# Patient Record
Sex: FEMALE | Race: WHITE | Employment: FULL TIME | URBAN - METROPOLITAN AREA
[De-identification: names, ages, dates, MRNs, and addresses within clinical notes are randomized per-mention and may not be internally consistent; named-entity substitution may affect disease eponyms.]

---

## 2021-10-13 ENCOUNTER — HOSPITAL ENCOUNTER (INPATIENT)
Age: 36
LOS: 2 days | Discharge: HOME | End: 2021-10-16
Attending: OBSTETRICS & GYNECOLOGY | Admitting: OBSTETRICS & GYNECOLOGY
Payer: COMMERCIAL

## 2021-10-13 DIAGNOSIS — G89.18 POST-OPERATIVE PAIN: Primary | ICD-10-CM

## 2021-10-14 ENCOUNTER — ANESTHESIA EVENT (OUTPATIENT)
Dept: LABOR AND DELIVERY | Age: 36
End: 2021-10-14
Payer: COMMERCIAL

## 2021-10-14 ENCOUNTER — ANESTHESIA (OUTPATIENT)
Dept: LABOR AND DELIVERY | Age: 36
End: 2021-10-14
Payer: COMMERCIAL

## 2021-10-14 VITALS — SYSTOLIC BLOOD PRESSURE: 110 MMHG | OXYGEN SATURATION: 100 % | DIASTOLIC BLOOD PRESSURE: 56 MMHG

## 2021-10-14 PROBLEM — Z3A.39 39 WEEKS GESTATION OF PREGNANCY: Status: ACTIVE | Noted: 2021-10-14

## 2021-10-14 LAB
ABO/RH: NORMAL
AMNISURE, POC: POSITIVE
AMPHETAMINE SCREEN, URINE: NOT DETECTED
ANTIBODY SCREEN: NORMAL
BARBITURATE SCREEN URINE: NOT DETECTED
BENZODIAZEPINE SCREEN, URINE: NOT DETECTED
CANNABINOID SCREEN URINE: NOT DETECTED
COCAINE METABOLITE SCREEN URINE: NOT DETECTED
FENTANYL SCREEN, URINE: NOT DETECTED
HCT VFR BLD CALC: 30.3 % (ref 34–48)
HEMOGLOBIN: 10.4 G/DL (ref 11.5–15.5)
Lab: NORMAL
Lab: NORMAL
MCH RBC QN AUTO: 31 PG (ref 26–35)
MCHC RBC AUTO-ENTMCNC: 34.3 % (ref 32–34.5)
MCV RBC AUTO: 90.2 FL (ref 80–99.9)
METHADONE SCREEN, URINE: NOT DETECTED
NEGATIVE QC PASS/FAIL: NORMAL
OPIATE SCREEN URINE: NOT DETECTED
OXYCODONE URINE: NOT DETECTED
PDW BLD-RTO: 14.2 FL (ref 11.5–15)
PHENCYCLIDINE SCREEN URINE: NOT DETECTED
PLATELET # BLD: 273 E9/L (ref 130–450)
PMV BLD AUTO: 9.7 FL (ref 7–12)
POSITIVE QC PASS/FAIL: NORMAL
RBC # BLD: 3.36 E12/L (ref 3.5–5.5)
WBC # BLD: 8.9 E9/L (ref 4.5–11.5)

## 2021-10-14 PROCEDURE — 2580000003 HC RX 258: Performed by: NURSE ANESTHETIST, CERTIFIED REGISTERED

## 2021-10-14 PROCEDURE — 7100000000 HC PACU RECOVERY - FIRST 15 MIN: Performed by: OBSTETRICS & GYNECOLOGY

## 2021-10-14 PROCEDURE — 1220000000 HC SEMI PRIVATE OB R&B

## 2021-10-14 PROCEDURE — 7100000001 HC PACU RECOVERY - ADDTL 15 MIN: Performed by: OBSTETRICS & GYNECOLOGY

## 2021-10-14 PROCEDURE — 36415 COLL VENOUS BLD VENIPUNCTURE: CPT

## 2021-10-14 PROCEDURE — 6370000000 HC RX 637 (ALT 250 FOR IP)

## 2021-10-14 PROCEDURE — 80307 DRUG TEST PRSMV CHEM ANLYZR: CPT

## 2021-10-14 PROCEDURE — 6360000002 HC RX W HCPCS: Performed by: OBSTETRICS & GYNECOLOGY

## 2021-10-14 PROCEDURE — 3700000000 HC ANESTHESIA ATTENDED CARE: Performed by: OBSTETRICS & GYNECOLOGY

## 2021-10-14 PROCEDURE — 2580000003 HC RX 258: Performed by: OBSTETRICS & GYNECOLOGY

## 2021-10-14 PROCEDURE — 3609079900 HC CESAREAN SECTION: Performed by: OBSTETRICS & GYNECOLOGY

## 2021-10-14 PROCEDURE — 2500000003 HC RX 250 WO HCPCS: Performed by: NURSE ANESTHETIST, CERTIFIED REGISTERED

## 2021-10-14 PROCEDURE — 6370000000 HC RX 637 (ALT 250 FOR IP): Performed by: OBSTETRICS & GYNECOLOGY

## 2021-10-14 PROCEDURE — 6360000002 HC RX W HCPCS: Performed by: NURSE ANESTHETIST, CERTIFIED REGISTERED

## 2021-10-14 PROCEDURE — 3700000001 HC ADD 15 MINUTES (ANESTHESIA): Performed by: OBSTETRICS & GYNECOLOGY

## 2021-10-14 PROCEDURE — 86900 BLOOD TYPING SEROLOGIC ABO: CPT

## 2021-10-14 PROCEDURE — 2709999900 HC NON-CHARGEABLE SUPPLY: Performed by: OBSTETRICS & GYNECOLOGY

## 2021-10-14 PROCEDURE — 85027 COMPLETE CBC AUTOMATED: CPT

## 2021-10-14 PROCEDURE — 86850 RBC ANTIBODY SCREEN: CPT

## 2021-10-14 PROCEDURE — 86901 BLOOD TYPING SEROLOGIC RH(D): CPT

## 2021-10-14 PROCEDURE — 59514 CESAREAN DELIVERY ONLY: CPT | Performed by: MIDWIFE

## 2021-10-14 PROCEDURE — 6360000002 HC RX W HCPCS

## 2021-10-14 RX ORDER — DOCUSATE SODIUM 100 MG/1
100 CAPSULE, LIQUID FILLED ORAL 2 TIMES DAILY
Status: DISCONTINUED | OUTPATIENT
Start: 2021-10-14 | End: 2021-10-14

## 2021-10-14 RX ORDER — FERROUS SULFATE 325(65) MG
325 TABLET ORAL 2 TIMES DAILY WITH MEALS
Status: DISCONTINUED | OUTPATIENT
Start: 2021-10-14 | End: 2021-10-16 | Stop reason: HOSPADM

## 2021-10-14 RX ORDER — ACETAMINOPHEN 325 MG/1
650 TABLET ORAL EVERY 4 HOURS PRN
Status: DISCONTINUED | OUTPATIENT
Start: 2021-10-14 | End: 2021-10-16 | Stop reason: HOSPADM

## 2021-10-14 RX ORDER — TRISODIUM CITRATE DIHYDRATE AND CITRIC ACID MONOHYDRATE 500; 334 MG/5ML; MG/5ML
30 SOLUTION ORAL ONCE
Status: COMPLETED | OUTPATIENT
Start: 2021-10-14 | End: 2021-10-14

## 2021-10-14 RX ORDER — SODIUM CHLORIDE 9 MG/ML
25 INJECTION, SOLUTION INTRAVENOUS PRN
Status: DISCONTINUED | OUTPATIENT
Start: 2021-10-14 | End: 2021-10-16 | Stop reason: HOSPADM

## 2021-10-14 RX ORDER — SODIUM CHLORIDE 9 MG/ML
INJECTION, SOLUTION INTRAVENOUS CONTINUOUS PRN
Status: DISCONTINUED | OUTPATIENT
Start: 2021-10-14 | End: 2021-10-14 | Stop reason: SDUPTHER

## 2021-10-14 RX ORDER — ONDANSETRON 4 MG/1
8 TABLET, ORALLY DISINTEGRATING ORAL EVERY 8 HOURS PRN
Status: DISCONTINUED | OUTPATIENT
Start: 2021-10-14 | End: 2021-10-16 | Stop reason: HOSPADM

## 2021-10-14 RX ORDER — BUPIVACAINE HYDROCHLORIDE 7.5 MG/ML
INJECTION, SOLUTION INTRASPINAL PRN
Status: DISCONTINUED | OUTPATIENT
Start: 2021-10-14 | End: 2021-10-14 | Stop reason: SDUPTHER

## 2021-10-14 RX ORDER — SODIUM CHLORIDE 0.9 % (FLUSH) 0.9 %
5-40 SYRINGE (ML) INJECTION EVERY 12 HOURS SCHEDULED
Status: DISCONTINUED | OUTPATIENT
Start: 2021-10-14 | End: 2021-10-14

## 2021-10-14 RX ORDER — MORPHINE SULFATE 2 MG/ML
2 INJECTION, SOLUTION INTRAMUSCULAR; INTRAVENOUS
Status: DISCONTINUED | OUTPATIENT
Start: 2021-10-14 | End: 2021-10-16 | Stop reason: HOSPADM

## 2021-10-14 RX ORDER — OXYCODONE HYDROCHLORIDE AND ACETAMINOPHEN 5; 325 MG/1; MG/1
2 TABLET ORAL EVERY 4 HOURS PRN
Status: DISCONTINUED | OUTPATIENT
Start: 2021-10-14 | End: 2021-10-16 | Stop reason: HOSPADM

## 2021-10-14 RX ORDER — SODIUM CHLORIDE, SODIUM LACTATE, POTASSIUM CHLORIDE, CALCIUM CHLORIDE 600; 310; 30; 20 MG/100ML; MG/100ML; MG/100ML; MG/100ML
INJECTION, SOLUTION INTRAVENOUS CONTINUOUS
Status: DISCONTINUED | OUTPATIENT
Start: 2021-10-14 | End: 2021-10-16 | Stop reason: HOSPADM

## 2021-10-14 RX ORDER — PROMETHAZINE HYDROCHLORIDE 25 MG/ML
6.25 INJECTION, SOLUTION INTRAMUSCULAR; INTRAVENOUS
Status: DISCONTINUED | OUTPATIENT
Start: 2021-10-14 | End: 2021-10-14 | Stop reason: HOSPADM

## 2021-10-14 RX ORDER — IBUPROFEN 800 MG/1
800 TABLET ORAL EVERY 8 HOURS PRN
Status: DISCONTINUED | OUTPATIENT
Start: 2021-10-15 | End: 2021-10-16 | Stop reason: HOSPADM

## 2021-10-14 RX ORDER — ONDANSETRON 2 MG/ML
INJECTION INTRAMUSCULAR; INTRAVENOUS PRN
Status: DISCONTINUED | OUTPATIENT
Start: 2021-10-14 | End: 2021-10-14 | Stop reason: SDUPTHER

## 2021-10-14 RX ORDER — SODIUM CHLORIDE 0.9 % (FLUSH) 0.9 %
10 SYRINGE (ML) INJECTION EVERY 12 HOURS SCHEDULED
Status: DISCONTINUED | OUTPATIENT
Start: 2021-10-14 | End: 2021-10-16 | Stop reason: HOSPADM

## 2021-10-14 RX ORDER — SODIUM CHLORIDE 0.9 % (FLUSH) 0.9 %
10 SYRINGE (ML) INJECTION PRN
Status: DISCONTINUED | OUTPATIENT
Start: 2021-10-14 | End: 2021-10-16 | Stop reason: HOSPADM

## 2021-10-14 RX ORDER — SIMETHICONE 80 MG
80 TABLET,CHEWABLE ORAL EVERY 6 HOURS PRN
Status: DISCONTINUED | OUTPATIENT
Start: 2021-10-14 | End: 2021-10-15 | Stop reason: RX

## 2021-10-14 RX ORDER — FENTANYL CITRATE 50 UG/ML
50 INJECTION, SOLUTION INTRAMUSCULAR; INTRAVENOUS EVERY 5 MIN PRN
Status: DISCONTINUED | OUTPATIENT
Start: 2021-10-14 | End: 2021-10-14 | Stop reason: HOSPADM

## 2021-10-14 RX ORDER — OXYCODONE HYDROCHLORIDE AND ACETAMINOPHEN 5; 325 MG/1; MG/1
1 TABLET ORAL EVERY 4 HOURS PRN
Status: DISCONTINUED | OUTPATIENT
Start: 2021-10-14 | End: 2021-10-16 | Stop reason: HOSPADM

## 2021-10-14 RX ORDER — FENTANYL CITRATE 50 UG/ML
25 INJECTION, SOLUTION INTRAMUSCULAR; INTRAVENOUS EVERY 5 MIN PRN
Status: DISCONTINUED | OUTPATIENT
Start: 2021-10-14 | End: 2021-10-14 | Stop reason: HOSPADM

## 2021-10-14 RX ORDER — PRENATAL WITH FERROUS FUM AND FOLIC ACID 3080; 920; 120; 400; 22; 1.84; 3; 20; 10; 1; 12; 200; 27; 25; 2 [IU]/1; [IU]/1; MG/1; [IU]/1; MG/1; MG/1; MG/1; MG/1; MG/1; MG/1; UG/1; MG/1; MG/1; MG/1; MG/1
1 TABLET ORAL DAILY
Status: DISCONTINUED | OUTPATIENT
Start: 2021-10-14 | End: 2021-10-16 | Stop reason: HOSPADM

## 2021-10-14 RX ORDER — SODIUM CHLORIDE 0.9 % (FLUSH) 0.9 %
5-40 SYRINGE (ML) INJECTION PRN
Status: DISCONTINUED | OUTPATIENT
Start: 2021-10-14 | End: 2021-10-14

## 2021-10-14 RX ORDER — ONDANSETRON 2 MG/ML
4 INJECTION INTRAMUSCULAR; INTRAVENOUS EVERY 6 HOURS PRN
Status: DISCONTINUED | OUTPATIENT
Start: 2021-10-14 | End: 2021-10-16 | Stop reason: HOSPADM

## 2021-10-14 RX ORDER — MODIFIED LANOLIN
OINTMENT (GRAM) TOPICAL
Status: DISCONTINUED | OUTPATIENT
Start: 2021-10-14 | End: 2021-10-16 | Stop reason: HOSPADM

## 2021-10-14 RX ORDER — LIDOCAINE HYDROCHLORIDE 10 MG/ML
INJECTION, SOLUTION INFILTRATION; PERINEURAL PRN
Status: DISCONTINUED | OUTPATIENT
Start: 2021-10-14 | End: 2021-10-14 | Stop reason: SDUPTHER

## 2021-10-14 RX ORDER — KETOROLAC TROMETHAMINE 30 MG/ML
30 INJECTION, SOLUTION INTRAMUSCULAR; INTRAVENOUS EVERY 6 HOURS
Status: COMPLETED | OUTPATIENT
Start: 2021-10-14 | End: 2021-10-15

## 2021-10-14 RX ORDER — SODIUM CHLORIDE, SODIUM LACTATE, POTASSIUM CHLORIDE, CALCIUM CHLORIDE 600; 310; 30; 20 MG/100ML; MG/100ML; MG/100ML; MG/100ML
INJECTION, SOLUTION INTRAVENOUS CONTINUOUS
Status: DISCONTINUED | OUTPATIENT
Start: 2021-10-14 | End: 2021-10-14

## 2021-10-14 RX ORDER — LIDOCAINE HYDROCHLORIDE 10 MG/ML
INJECTION, SOLUTION INFILTRATION; PERINEURAL
Status: COMPLETED
Start: 2021-10-14 | End: 2021-10-14

## 2021-10-14 RX ORDER — ACETAMINOPHEN 650 MG
TABLET, EXTENDED RELEASE ORAL
Status: DISCONTINUED
Start: 2021-10-14 | End: 2021-10-14

## 2021-10-14 RX ORDER — MORPHINE SULFATE 4 MG/ML
4 INJECTION, SOLUTION INTRAMUSCULAR; INTRAVENOUS
Status: DISCONTINUED | OUTPATIENT
Start: 2021-10-14 | End: 2021-10-16 | Stop reason: HOSPADM

## 2021-10-14 RX ORDER — PHENYLEPHRINE HCL IN 0.9% NACL 1 MG/10 ML
SYRINGE (ML) INTRAVENOUS PRN
Status: DISCONTINUED | OUTPATIENT
Start: 2021-10-14 | End: 2021-10-14 | Stop reason: SDUPTHER

## 2021-10-14 RX ORDER — FENTANYL CITRATE 50 UG/ML
INJECTION, SOLUTION INTRAMUSCULAR; INTRAVENOUS PRN
Status: DISCONTINUED | OUTPATIENT
Start: 2021-10-14 | End: 2021-10-14 | Stop reason: SDUPTHER

## 2021-10-14 RX ORDER — SODIUM CHLORIDE, SODIUM LACTATE, POTASSIUM CHLORIDE, AND CALCIUM CHLORIDE .6; .31; .03; .02 G/100ML; G/100ML; G/100ML; G/100ML
500 INJECTION, SOLUTION INTRAVENOUS PRN
Status: DISCONTINUED | OUTPATIENT
Start: 2021-10-14 | End: 2021-10-14

## 2021-10-14 RX ORDER — ONDANSETRON 2 MG/ML
4 INJECTION INTRAMUSCULAR; INTRAVENOUS EVERY 6 HOURS PRN
Status: DISCONTINUED | OUTPATIENT
Start: 2021-10-14 | End: 2021-10-14

## 2021-10-14 RX ORDER — BISACODYL 10 MG
10 SUPPOSITORY, RECTAL RECTAL DAILY PRN
Status: DISCONTINUED | OUTPATIENT
Start: 2021-10-14 | End: 2021-10-16 | Stop reason: HOSPADM

## 2021-10-14 RX ORDER — TRISODIUM CITRATE DIHYDRATE AND CITRIC ACID MONOHYDRATE 500; 334 MG/5ML; MG/5ML
SOLUTION ORAL
Status: COMPLETED
Start: 2021-10-14 | End: 2021-10-14

## 2021-10-14 RX ORDER — PANTOPRAZOLE SODIUM 40 MG/1
40 TABLET, DELAYED RELEASE ORAL DAILY
Status: DISCONTINUED | OUTPATIENT
Start: 2021-10-14 | End: 2021-10-16 | Stop reason: HOSPADM

## 2021-10-14 RX ORDER — SODIUM CHLORIDE 9 MG/ML
25 INJECTION, SOLUTION INTRAVENOUS PRN
Status: DISCONTINUED | OUTPATIENT
Start: 2021-10-14 | End: 2021-10-14

## 2021-10-14 RX ORDER — SODIUM CHLORIDE, SODIUM LACTATE, POTASSIUM CHLORIDE, AND CALCIUM CHLORIDE .6; .31; .03; .02 G/100ML; G/100ML; G/100ML; G/100ML
1000 INJECTION, SOLUTION INTRAVENOUS PRN
Status: DISCONTINUED | OUTPATIENT
Start: 2021-10-14 | End: 2021-10-14

## 2021-10-14 RX ORDER — DOCUSATE SODIUM 100 MG/1
100 CAPSULE, LIQUID FILLED ORAL 2 TIMES DAILY
Status: DISCONTINUED | OUTPATIENT
Start: 2021-10-14 | End: 2021-10-16 | Stop reason: HOSPADM

## 2021-10-14 RX ADMIN — SODIUM CITRATE AND CITRIC ACID MONOHYDRATE 30 ML: 500; 334 SOLUTION ORAL at 05:18

## 2021-10-14 RX ADMIN — BUPIVACAINE HYDROCHLORIDE IN DEXTROSE 1.6 MG: 7.5 INJECTION, SOLUTION SUBARACHNOID at 05:36

## 2021-10-14 RX ADMIN — LIDOCAINE HYDROCHLORIDE 3 ML: 10 INJECTION, SOLUTION INFILTRATION; PERINEURAL at 05:34

## 2021-10-14 RX ADMIN — TRISODIUM CITRATE DIHYDRATE AND CITRIC ACID MONOHYDRATE 30 ML: 500; 334 SOLUTION ORAL at 05:18

## 2021-10-14 RX ADMIN — ONDANSETRON 4 MG: 2 INJECTION INTRAMUSCULAR; INTRAVENOUS at 05:26

## 2021-10-14 RX ADMIN — DOCUSATE SODIUM 100 MG: 100 CAPSULE, LIQUID FILLED ORAL at 19:30

## 2021-10-14 RX ADMIN — SODIUM CHLORIDE, PRESERVATIVE FREE 10 ML: 5 INJECTION INTRAVENOUS at 20:57

## 2021-10-14 RX ADMIN — CEFAZOLIN 1000 MG: 1 INJECTION, POWDER, FOR SOLUTION INTRAMUSCULAR; INTRAVENOUS at 13:38

## 2021-10-14 RX ADMIN — Medication 909 ML/HR: at 05:56

## 2021-10-14 RX ADMIN — KETOROLAC TROMETHAMINE 30 MG: 30 INJECTION, SOLUTION INTRAMUSCULAR at 07:52

## 2021-10-14 RX ADMIN — CEFAZOLIN 1000 MG: 1 INJECTION, POWDER, FOR SOLUTION INTRAMUSCULAR; INTRAVENOUS at 20:57

## 2021-10-14 RX ADMIN — OXYCODONE AND ACETAMINOPHEN 2 TABLET: 5; 325 TABLET ORAL at 18:37

## 2021-10-14 RX ADMIN — OXYCODONE AND ACETAMINOPHEN 2 TABLET: 5; 325 TABLET ORAL at 14:37

## 2021-10-14 RX ADMIN — Medication 2000 MG: at 05:19

## 2021-10-14 RX ADMIN — OXYCODONE AND ACETAMINOPHEN 2 TABLET: 5; 325 TABLET ORAL at 22:42

## 2021-10-14 RX ADMIN — Medication 1 MILLI-UNITS/MIN: at 02:40

## 2021-10-14 RX ADMIN — SODIUM CHLORIDE, POTASSIUM CHLORIDE, SODIUM LACTATE AND CALCIUM CHLORIDE: 600; 310; 30; 20 INJECTION, SOLUTION INTRAVENOUS at 02:30

## 2021-10-14 RX ADMIN — FENTANYL CITRATE 25 MCG: 50 INJECTION, SOLUTION INTRAMUSCULAR; INTRAVENOUS at 05:36

## 2021-10-14 RX ADMIN — SODIUM CHLORIDE: 9 INJECTION, SOLUTION INTRAVENOUS at 05:24

## 2021-10-14 RX ADMIN — KETOROLAC TROMETHAMINE 30 MG: 30 INJECTION, SOLUTION INTRAMUSCULAR at 19:28

## 2021-10-14 RX ADMIN — SODIUM CHLORIDE, PRESERVATIVE FREE 10 ML: 5 INJECTION INTRAVENOUS at 19:29

## 2021-10-14 RX ADMIN — KETOROLAC TROMETHAMINE 30 MG: 30 INJECTION, SOLUTION INTRAMUSCULAR at 13:38

## 2021-10-14 RX ADMIN — Medication 100 MCG: at 06:15

## 2021-10-14 RX ADMIN — OXYCODONE AND ACETAMINOPHEN 1 TABLET: 5; 325 TABLET ORAL at 10:24

## 2021-10-14 ASSESSMENT — PAIN DESCRIPTION - LOCATION: LOCATION: ABDOMEN;INCISION

## 2021-10-14 ASSESSMENT — PULMONARY FUNCTION TESTS
PIF_VALUE: 0

## 2021-10-14 ASSESSMENT — PAIN SCALES - GENERAL
PAINLEVEL_OUTOF10: 2
PAINLEVEL_OUTOF10: 6
PAINLEVEL_OUTOF10: 5
PAINLEVEL_OUTOF10: 0
PAINLEVEL_OUTOF10: 7
PAINLEVEL_OUTOF10: 3
PAINLEVEL_OUTOF10: 7
PAINLEVEL_OUTOF10: 7
PAINLEVEL_OUTOF10: 4

## 2021-10-14 ASSESSMENT — PAIN DESCRIPTION - PAIN TYPE: TYPE: ACUTE PAIN;SURGICAL PAIN

## 2021-10-14 ASSESSMENT — PAIN DESCRIPTION - PROGRESSION: CLINICAL_PROGRESSION: NOT CHANGED

## 2021-10-14 ASSESSMENT — LIFESTYLE VARIABLES: SMOKING_STATUS: 0

## 2021-10-14 ASSESSMENT — PAIN DESCRIPTION - ORIENTATION: ORIENTATION: LOWER

## 2021-10-14 ASSESSMENT — PAIN DESCRIPTION - FREQUENCY: FREQUENCY: INTERMITTENT

## 2021-10-14 ASSESSMENT — PAIN - FUNCTIONAL ASSESSMENT: PAIN_FUNCTIONAL_ASSESSMENT: ACTIVITIES ARE NOT PREVENTED

## 2021-10-14 ASSESSMENT — PAIN DESCRIPTION - DESCRIPTORS: DESCRIPTORS: TENDER

## 2021-10-14 NOTE — ANESTHESIA PRE PROCEDURE
Readings from Last 3 Encounters:   10/14/21 189 lb (85.7 kg)   10/12/21 189 lb 9.6 oz (86 kg)   10/06/21 187 lb (84.8 kg)     Body mass index is 27.91 kg/m². CBC:   Lab Results   Component Value Date    WBC 8.9 10/14/2021    RBC 3.36 10/14/2021    HGB 10.4 10/14/2021    HCT 30.3 10/14/2021    MCV 90.2 10/14/2021    RDW 14.2 10/14/2021     10/14/2021       CMP: No results found for: NA, K, CL, CO2, BUN, CREATININE, GFRAA, AGRATIO, LABGLOM, GLUCOSE, PROT, CALCIUM, BILITOT, ALKPHOS, AST, ALT    POC Tests: No results for input(s): POCGLU, POCNA, POCK, POCCL, POCBUN, POCHEMO, POCHCT in the last 72 hours. Coags: No results found for: PROTIME, INR, APTT    HCG (If Applicable): No results found for: PREGTESTUR, PREGSERUM, HCG, HCGQUANT     ABGs: No results found for: PHART, PO2ART, IBB4AMF, TSG1YMA, BEART, A6DRVFKL     Type & Screen (If Applicable):  No results found for: LABABO, LABRH    Drug/Infectious Status (If Applicable):  No results found for: HIV, HEPCAB    COVID-19 Screening (If Applicable): No results found for: COVID19        Anesthesia Evaluation  Patient summary reviewed and Nursing notes reviewed no history of anesthetic complications:   Airway: Mallampati: II  TM distance: <3 FB     Mouth opening: > = 3 FB Dental:          Pulmonary:Negative Pulmonary ROS breath sounds clear to auscultation      (-) not a current smoker          Patient did not smoke on day of surgery. Cardiovascular:Negative CV ROS  Exercise tolerance: good (>4 METS),           Rhythm: regular  Rate: normal           Beta Blocker:  Not on Beta Blocker         Neuro/Psych:   Negative Neuro/Psych ROS              GI/Hepatic/Renal:   (+) GERD:,           Endo/Other:    (+) blood dyscrasia: anemia:., .          Pt had no PAT visit        ROS comment:  IUP@ 39 week and 4 days Abdominal:             Vascular: negative vascular ROS.          Other Findings:             Anesthesia Plan      epidural, general and spinal     ASA 2     (Discussed labor analgesia options with patient and significant other at bedside. Pt is agreeable to epidural when ready, states she had one previous epidural without complications. Discussed risks/benefits of epidural. )      MIPS: Postoperative opioids intended and Prophylactic antiemetics administered. Anesthetic plan and risks discussed with patient and spouse. Use of blood products discussed with patient whom consented to blood products. Plan discussed with CRNA.                 Abel Kurtz RN, [place]   10/14/2021

## 2021-10-14 NOTE — PROGRESS NOTES
SVE at 0445 4-5 cm, unsure of presenting part. House doctor notified. 0500: Dr Walter Estrada at the bedside with Ultrasound. Confirms baby is breech. c-section called at this time.

## 2021-10-14 NOTE — PROGRESS NOTES
Mom admitted from L&D to 302. Transferred from cart to bed without difficulty. Assessment as charted. IV LR and IV Pitocin infusing as ordered. PCD's on. Arriaza catheter patent - draining clear yellow urine. Comfortable. Oriented to room and unit. FOB @ bedside.

## 2021-10-14 NOTE — ANESTHESIA PROCEDURE NOTES
Spinal Block    Patient location during procedure: OR  Reason for block: primary anesthetic and at surgeon's request  Staffing  Performed: other anesthesia staff   Anesthesiologist: Jessica Kim MD  Resident/CRNA: Mary Manzo APRN - CRNA  Other anesthesia staff: Citlaly Robertson RN  Preanesthetic Checklist  Completed: patient identified, IV checked, site marked, risks and benefits discussed, surgical consent, monitors and equipment checked, pre-op evaluation, timeout performed, anesthesia consent given, oxygen available and patient being monitored  Spinal Block  Patient position: sitting  Prep: ChloraPrep  Patient monitoring: cardiac monitor, continuous pulse ox, continuous capnometry and frequent blood pressure checks  Approach: midline  Location: L3/L4  Provider prep: mask, sterile gloves and sterile gown  Local infiltration: lidocaine  Dose: 0.5  Agent: bupivacaine  Adjuvant: fentanyl  Dose: 1.6  Dose: 1.6  Needle  Needle type: Pencan   Needle gauge: 25 G  Needle length: 3.5 in  Assessment  Sensory level: T6  Events: cerebrospinal fluid  Swirl obtained: Yes  CSF: clear  Attempts: 1  Hemodynamics: stable  Additional Notes  Pt tolerate well, laid immediately supine.  <1cc EBL

## 2021-10-14 NOTE — H&P
Department of Obstetrics and Gynecology  Attending Obstetrics History and Physical        CHIEF COMPLAINT: Spontaneous rupture membranes at 10 PM     HISTORY OF PRESENT ILLNESS:      The patient is a 31 y.o. female , Patient's last menstrual period was 01/10/2021.,  at 39w4d. OB History        2    Para   1    Term   1            AB        Living   1       SAB        TAB        Ectopic        Molar        Multiple        Live Births   1            Patient presents with a chief complaint as above. Estimated Due Date: Estimated Date of Delivery: 10/17/21    PRENATAL CARE:    Complicated by:   Patient Active Problem List   Diagnosis Code    39 weeks gestation of pregnancy Z3A.39       PAST OB HISTORY  OB History        2    Para   1    Term   1            AB        Living   1       SAB        TAB        Ectopic        Molar        Multiple        Live Births   1                Past Medical History:        Diagnosis Date    Anemia     with pregnancy     Past Surgical History:        Procedure Laterality Date    EYE SURGERY      childhood      Social History:    TOBACCO:   reports that she has never smoked. She has never used smokeless tobacco.  ETOH:   reports previous alcohol use. DRUGS:   reports no history of drug use. Family History:   No family history on file. Medications Prior to Admission:  Medications Prior to Admission: clindamycin (CLEOCIN T) 1 % lotion, Apply topically  Omega-3 Fatty Acids (FISH OIL) 500 MG CAPS, Take by mouth  Polysaccharide Iron Complex 391.3 (180 Fe) MG CAPS, Take 180 mg by mouth 2 times daily (with meals)  Potassium 75 MG TABS, Take by mouth  PRENATAL VIT-FE FUMARATE-FA PO, Take 1 tablet by mouth daily  MAGNESIUM PO, Take 1 tablet by mouth daily as needed  Allergies:  Patient has no known allergies.     Review of Systems:   Ears, nose, mouth, throat, and face: negative  Respiratory: negative  Cardiovascular: negative  Gastrointestinal: negative  Genitourinary:negative  Integument/breast: negative  Hematologic/lymphatic: negative  Musculoskeletal:negative  Neurological: negative  Behavioral/Psych: negative  Endocrine: negative  Allergic/Immunologic: negative    PHYSICAL EXAM:    General appearance:  awake, alert, cooperative, no apparent distress, and appears stated age  Neurologic:  Awake, alert, oriented to name, place and time. Cranial nerves II-XII are grossly intact. Motor is 5 out of 5 bilaterally. Cerebellar finger to nose, heel to shin intact. Sensory is intact.   Babinski down going, Romberg negative, and gait is normal.  Lungs:  No increased work of breathing, good air exchange, clear to auscultation bilaterally, no crackles or wheezing  Heart:  Normal apical impulse, regular rate and rhythm, normal S1 and S2, no S3 or S4, and no murmur noted  Abdomen:  No scars, normal bowel sounds, soft, non-distended, non-tender, no masses palpated, no hepatosplenomegally  Fetal heart rate:  Baseline Heart Rate 140, accelerations: Present, decelerations: Absent  Pelvis:  External Genitalia: General appearance; normal, Hair distribution; normal, Lesions absent  Cervix: Per nursing    DILATION:  1cm  EFFACEMENT:   60%  STATION:  -2cm      Contraction frequency:  irregminutes  Membranes: amnisure +    /62   Pulse 57   Temp 98 °F (36.7 °C) (Oral)   Resp 16   Ht 5' 9\" (1.753 m)   Wt 189 lb (85.7 kg)   LMP 01/10/2021   SpO2 99%   BMI 27.91 kg/m²     General Labs:    CBC:   Lab Results   Component Value Date    WBC 8.9 10/14/2021    RBC 3.36 10/14/2021    HGB 10.4 10/14/2021    HCT 30.3 10/14/2021    MCV 90.2 10/14/2021    RDW 14.2 10/14/2021     10/14/2021     CMP:  No results found for: NA, K, CL, CO2, BUN, PROT, ALB  U/A:  No components found for: Charlyne Gold, USPGRAV, UPH, UPROTEIN, UGLUCOSE, UKETONE, UBILI, UBLOOD, UNITRITE, UUROBIL, ULEUKEST, USQEPI, URENEPI, UWBC, URBC, Synchari, UHYALINE    ASSESSMENT AND PLAN:  IUP at 44 weeks  Spontaneous rupture membrane  Dr. Lucrecia Roque notified      Electronically signed by Carmen Oneil MD on 10/14/2021 at 6:34 AM

## 2021-10-14 NOTE — PROGRESS NOTES
I assisted Dr. Luis Manuel Ritter in  section.   I was present from incision through closure of subcutaneous tissue

## 2021-10-14 NOTE — PROCEDURES
PREOPERATIVE DIAGNOSES:     1.  39 week intrauterine pregnancy.   2.  Breech presentation      POSTOPERATIVE DIAGNOSES:     Same.      PROCEDURE: Primary low transverse  section.      SURGEON:  MD gurdeep Chung  ESTIMATED BLOOD LOSS:  725 mL.      COMPLICATIONS:  None.         ANESTHESIA:  spinal.         FINDINGS:  [unfilled]  infant Apgars APGAR One: N/A  and APGAR Five: N/A , weight: [unfilled] lbs  [unfilled] oz  .  Normal  tubes and ovaries bilaterally.         DETAILS OF PROCEDURE:  See Full Note Dictated    Landon Garcia MD 10/14/2021 6:42 AM

## 2021-10-14 NOTE — PROGRESS NOTES
Primary ltcs of viable baby girl at 12. Delayed cord clamping completed. apgars 9/9. Mom and baby stable at this time.

## 2021-10-14 NOTE — PROGRESS NOTES
Taking in po well. Tolerated general diet for lunch. IV fluids discontinued. PRN adapter maintained. PCD's off. Ambulated to bathroom with assist.  Arriaza catheter discontinued without difficulty. DTV [id number]. Vaginal bleeding RNA. Instructed on lisa care. Comfortable. FOB @ bedside.

## 2021-10-14 NOTE — PROGRESS NOTES
Notified Dr Marychuy Shaw of patient arrival. Updated that patient amnisure = positive. Notified of recent sve 1/60/-2. Notified that fhts had one variable with minimal variability, but variability is now moderate. Orders received.

## 2021-10-14 NOTE — PROGRESS NOTES
Ambulated to bathroom with assist.  Voided large amount without difficulty. Re-instructed on lisa care.

## 2021-10-14 NOTE — LACTATION NOTE
Pt is a multip that is choosing to exclusively pump. She is pumping every 3 hours and syringe feeding colostrum. She has a history of oversupply. EBP is set up at bedside. Pt request to be sized for proper flange fit. 24mm standard flange size appropriate for this pt. Encouraged skin to skin and regular pumping sessions as she is actively performing. Support and encouragement given, contact number provided for questions, concerns, or supplies.

## 2021-10-15 LAB
HCT VFR BLD CALC: 29.6 % (ref 34–48)
HEMOGLOBIN: 10 G/DL (ref 11.5–15.5)
MCH RBC QN AUTO: 31.7 PG (ref 26–35)
MCHC RBC AUTO-ENTMCNC: 33.8 % (ref 32–34.5)
MCV RBC AUTO: 94 FL (ref 80–99.9)
PDW BLD-RTO: 14.3 FL (ref 11.5–15)
PLATELET # BLD: 213 E9/L (ref 130–450)
PMV BLD AUTO: 9.8 FL (ref 7–12)
RBC # BLD: 3.15 E12/L (ref 3.5–5.5)
WBC # BLD: 10.5 E9/L (ref 4.5–11.5)

## 2021-10-15 PROCEDURE — 36415 COLL VENOUS BLD VENIPUNCTURE: CPT

## 2021-10-15 PROCEDURE — 6360000002 HC RX W HCPCS: Performed by: OBSTETRICS & GYNECOLOGY

## 2021-10-15 PROCEDURE — 85027 COMPLETE CBC AUTOMATED: CPT

## 2021-10-15 PROCEDURE — 6370000000 HC RX 637 (ALT 250 FOR IP): Performed by: OBSTETRICS & GYNECOLOGY

## 2021-10-15 PROCEDURE — 1220000000 HC SEMI PRIVATE OB R&B

## 2021-10-15 PROCEDURE — 2580000003 HC RX 258: Performed by: OBSTETRICS & GYNECOLOGY

## 2021-10-15 RX ORDER — SIMETHICONE 125 MG
125 TABLET,CHEWABLE ORAL EVERY 6 HOURS PRN
Status: DISCONTINUED | OUTPATIENT
Start: 2021-10-15 | End: 2021-10-16 | Stop reason: HOSPADM

## 2021-10-15 RX ADMIN — Medication: at 08:36

## 2021-10-15 RX ADMIN — SIMETHICONE 125 MG: 125 TABLET, CHEWABLE ORAL at 15:00

## 2021-10-15 RX ADMIN — OXYCODONE AND ACETAMINOPHEN 2 TABLET: 5; 325 TABLET ORAL at 14:59

## 2021-10-15 RX ADMIN — SODIUM CHLORIDE, PRESERVATIVE FREE 10 ML: 5 INJECTION INTRAVENOUS at 01:42

## 2021-10-15 RX ADMIN — IBUPROFEN 800 MG: 800 TABLET, FILM COATED ORAL at 17:34

## 2021-10-15 RX ADMIN — DOCUSATE SODIUM 100 MG: 100 CAPSULE, LIQUID FILLED ORAL at 08:36

## 2021-10-15 RX ADMIN — PANTOPRAZOLE SODIUM 40 MG: 40 TABLET, DELAYED RELEASE ORAL at 08:36

## 2021-10-15 RX ADMIN — IBUPROFEN 800 MG: 800 TABLET, FILM COATED ORAL at 08:36

## 2021-10-15 RX ADMIN — SIMETHICONE 125 MG: 125 TABLET, CHEWABLE ORAL at 00:40

## 2021-10-15 RX ADMIN — OXYCODONE AND ACETAMINOPHEN 2 TABLET: 5; 325 TABLET ORAL at 23:04

## 2021-10-15 RX ADMIN — SIMETHICONE 125 MG: 125 TABLET, CHEWABLE ORAL at 08:36

## 2021-10-15 RX ADMIN — KETOROLAC TROMETHAMINE 30 MG: 30 INJECTION, SOLUTION INTRAMUSCULAR at 01:42

## 2021-10-15 RX ADMIN — METFORMIN HYDROCHLORIDE 1 TABLET: 500 TABLET, EXTENDED RELEASE ORAL at 08:36

## 2021-10-15 RX ADMIN — DOCUSATE SODIUM 100 MG: 100 CAPSULE, LIQUID FILLED ORAL at 20:20

## 2021-10-15 RX ADMIN — OXYCODONE AND ACETAMINOPHEN 2 TABLET: 5; 325 TABLET ORAL at 10:03

## 2021-10-15 RX ADMIN — OXYCODONE AND ACETAMINOPHEN 2 TABLET: 5; 325 TABLET ORAL at 04:47

## 2021-10-15 RX ADMIN — OXYCODONE AND ACETAMINOPHEN 2 TABLET: 5; 325 TABLET ORAL at 18:56

## 2021-10-15 ASSESSMENT — PAIN SCALES - GENERAL
PAINLEVEL_OUTOF10: 7
PAINLEVEL_OUTOF10: 4
PAINLEVEL_OUTOF10: 3
PAINLEVEL_OUTOF10: 2
PAINLEVEL_OUTOF10: 8
PAINLEVEL_OUTOF10: 7

## 2021-10-15 NOTE — ANESTHESIA POSTPROCEDURE EVALUATION
Department of Anesthesiology  Postprocedure Note    Patient: Kathleen Wan  MRN: [id number]  YOB: 1985  Date of evaluation: 10/15/2021  Time:  11:32 AM     Procedure Summary     Date: 10/14/21 Room / Location: [address]    Anesthesia Start: 7658 Anesthesia Stop: 0226    Procedures:        SECTION (N/A Uterus)      Labor Analgesia Diagnosis:        delivery indicated due to breech presentation      (breech)    Surgeons: Kathy Chowdhury MD Responsible Provider: Steve Mares MD    Anesthesia Type: epidural, general, spinal ASA Status: 2          Anesthesia Type: epidural, general, spinal    Radha Phase I: Radha Score: 10    Radha Phase II:      Last vitals: Reviewed and per EMR flowsheets.        Anesthesia Post Evaluation    Patient location during evaluation: bedside  Patient participation: complete - patient participated  Level of consciousness: awake  Pain score: 0  Airway patency: patent  Nausea & Vomiting: no nausea and no vomiting  Complications: no  Cardiovascular status: hemodynamically stable  Respiratory status: acceptable  Hydration status: euvolemic

## 2021-10-15 NOTE — PROGRESS NOTES
Progress Note    SUBJECTIVE:   Pt comfortable; +void; +flatus; +ambulation    OBJECTIVE:    VITALS:  /64   Pulse 69   Temp 98.2 °F (36.8 °C) (Oral)   Resp 18   Ht 5' 9\" (1.753 m)   Wt 189 lb (85.7 kg)   LMP 01/10/2021   SpO2 98%   Breastfeeding Unknown   BMI 27.91 kg/m²   Physical Exam  ABDOMEN:  normal bowel sounds, non-distended, non-tender and incision d/i  Ext nt    DATA:  Hemoglobin/Hematocrit:    Lab Results   Component Value Date    HGB 10.0 10/15/2021    HCT 29.6 10/15/2021       ASSESSMENT AND PLAN:  S/p ltcs  Active Problems:    39 weeks gestation of pregnancy     delivery indicated due to breech presentation  Resolved Problems:    * No resolved hospital problems.  *    POD#1  Plan discharge home in 1-2 days    Electronically signed by Nayeli Wright DO on 10/15/2021 at 3:11 PM

## 2021-10-15 NOTE — PLAN OF CARE
Problem: Anxiety:  Goal: Level of anxiety will decrease  Description: Level of anxiety will decrease  Outcome: Met This Shift     Problem: Fluid Volume - Imbalance:  Goal: Absence of postpartum hemorrhage signs and symptoms  Description: Absence of postpartum hemorrhage signs and symptoms  Outcome: Met This Shift     Problem: Infection - Intrapartum Infection:  Goal: Will show no infection signs and symptoms  Description: Will show no infection signs and symptoms  Outcome: Met This Shift     Problem: Pain - Acute:  Goal: Pain level will decrease  Description: Pain level will decrease  Outcome: Met This Shift  Goal: Able to cope with pain  Description: Able to cope with pain  Outcome: Met This Shift     Problem: Urinary Retention:  Goal: Experiences of bladder distention will decrease  Description: Experiences of bladder distention will decrease  Outcome: Met This Shift  Goal: Urinary elimination within specified parameters  Description: Urinary elimination within specified parameters  Outcome: Met This Shift     Problem: Infection - Surgical Site:  Goal: Will show no infection signs and symptoms  Description: Will show no infection signs and symptoms  Outcome: Met This Shift     Problem: Mood - Altered:  Goal: Mood stable  Description: Mood stable  Outcome: Met This Shift     Problem: Nausea/Vomiting:  Goal: Absence of nausea/vomiting  Description: Absence of nausea/vomiting  Outcome: Met This Shift

## 2021-10-15 NOTE — OP NOTE
The  bladder blade was inserted. Uterus was incised with a scalpel. Membranes were ruptured and noted to be clear and freely flowing. Baby  was delivered blossom breech. Nose and mouth were suctioned with bulb  suction. Cord was clamped and cut, vigorous at birth. A 30-second  delay noted. Baby was handed off to awaiting nursing staff. Cord gases  and cord blood were obtained. Placenta was manually extracted. Uterus  was exteriorized and cleared of all clots and debris. Uterine incision  was closed with chromic suture in a running locked fashion x2. Uterus  was returned to the pelvis. Gutters were cleared of all clots and  debris and excellent hemostasis was confirmed at the uterine incision. SNoW was placed. All instrumentation was removed. Hemostasis was  confirmed once again. Fascia was then closed with Stratafix. Subcutaneous tissue was irrigated and reapproximated with plain and  subcuticular suture followed by staples and Dermabond. Sponge, lap, and  needle counts were correct x2. Two grams of Ancef were given  preoperatively. The patient was transferred to Recovery in stable  condition.         Dante Gann MD    D: 10/14/2021 6:42:29       T: 10/14/2021 10:29:30     [email or web address]  Job#: [id number]     Doc#: [id number]    CC:

## 2021-10-15 NOTE — PROGRESS NOTES
Good post-op day. Ambulating. Voiding. Passing gas. Vaginal bleeding RNA. Satisfied with pain control. FOB @ bedside.

## 2021-10-15 NOTE — LACTATION NOTE
Patient is exclusively pumping Q 3 hrs x 15-20 min. Collected 9 mls and 6 mls at 2 separate pumping sessions this morning. Patient states Medela electric breast pump was making a \"popping\" sound. Replaced and set up new pump at bedside. Gave more bottle nipples per request.  Patient is dedicated and knowledgeable about pumping. Has Hx of over-supply. Gave information on [address] Encouraged to maintain pumping routine to stimulate milk production and to call with any needs. Has Medela electric breast pump at home.

## 2021-10-16 VITALS
SYSTOLIC BLOOD PRESSURE: 107 MMHG | OXYGEN SATURATION: 98 % | HEART RATE: 79 BPM | WEIGHT: 189 LBS | TEMPERATURE: 98 F | RESPIRATION RATE: 16 BRPM | HEIGHT: 69 IN | BODY MASS INDEX: 27.99 KG/M2 | DIASTOLIC BLOOD PRESSURE: 61 MMHG

## 2021-10-16 PROCEDURE — 6370000000 HC RX 637 (ALT 250 FOR IP): Performed by: OBSTETRICS & GYNECOLOGY

## 2021-10-16 RX ORDER — IBUPROFEN 800 MG/1
800 TABLET ORAL EVERY 8 HOURS PRN
Qty: 30 TABLET | Refills: 0 | Status: SHIPPED | OUTPATIENT
Start: 2021-10-16

## 2021-10-16 RX ORDER — OXYCODONE HYDROCHLORIDE AND ACETAMINOPHEN 5; 325 MG/1; MG/1
1 TABLET ORAL EVERY 4 HOURS PRN
Qty: 12 TABLET | Refills: 0 | Status: SHIPPED | OUTPATIENT
Start: 2021-10-16 | End: 2021-10-19

## 2021-10-16 RX ADMIN — PANTOPRAZOLE SODIUM 40 MG: 40 TABLET, DELAYED RELEASE ORAL at 08:34

## 2021-10-16 RX ADMIN — OXYCODONE AND ACETAMINOPHEN 2 TABLET: 5; 325 TABLET ORAL at 08:34

## 2021-10-16 RX ADMIN — IBUPROFEN 800 MG: 800 TABLET, FILM COATED ORAL at 02:20

## 2021-10-16 RX ADMIN — OXYCODONE AND ACETAMINOPHEN 2 TABLET: 5; 325 TABLET ORAL at 04:04

## 2021-10-16 RX ADMIN — DOCUSATE SODIUM 100 MG: 100 CAPSULE, LIQUID FILLED ORAL at 08:34

## 2021-10-16 RX ADMIN — OXYCODONE AND ACETAMINOPHEN 2 TABLET: 5; 325 TABLET ORAL at 12:51

## 2021-10-16 RX ADMIN — OXYCODONE AND ACETAMINOPHEN 2 TABLET: 5; 325 TABLET ORAL at 16:57

## 2021-10-16 RX ADMIN — METFORMIN HYDROCHLORIDE 1 TABLET: 500 TABLET, EXTENDED RELEASE ORAL at 08:34

## 2021-10-16 RX ADMIN — IBUPROFEN 800 MG: 800 TABLET, FILM COATED ORAL at 09:48

## 2021-10-16 ASSESSMENT — PAIN SCALES - GENERAL
PAINLEVEL_OUTOF10: 7
PAINLEVEL_OUTOF10: 7
PAINLEVEL_OUTOF10: 8
PAINLEVEL_OUTOF10: 8
PAINLEVEL_OUTOF10: 3
PAINLEVEL_OUTOF10: 6

## 2021-10-16 NOTE — PROGRESS NOTES
Discharge instructions reviewed and education provided with patient regarding her care and the care of baby. ID bands checked and verified. Prescription for percocet given.

## 2021-10-16 NOTE — DISCHARGE SUMMARY
Obstetric Discharge Summary    Admitting Diagnosis  IUP 39+ weeks  OB History      Para Term  AB Living    3 2 2     2    SAB TAB Ectopic Molar Multiple Live Births              2          Reasons for Admission on 2018 12:21 AM  Hyperemesis [R11.10]  Hyperemesis [R11.10]  Hyperemesis [R11.10]  No comment available  Onset of Labor    Prenatal Procedures  None    Intrapartum Procedures                  Delivery:        Postpartum Procedures  None    Postpartum/Operative Complications       [place] [facility]  This patient has no babies on file. Discharge With Mother  Complications: No    Discharge Diagnosis       Discharge Information  Discharge Medication List as of 2018  9:55 AM      CONTINUE these medications which have CHANGED    Details   ondansetron (ZOFRAN ODT) 4 MG disintegrating tablet Take 1 tablet by mouth every 8 hours as needed for Nausea or Vomiting, Disp-30 tablet, R-0Normal         CONTINUE these medications which have NOT CHANGED    Details   multivitamin (ANIMAL SHAPES) with C & FA CHEW chewable tablet Take 1 tablet by mouth dailyHistorical Med         STOP taking these medications       Rraztn-FwXzz-SaIcm-Meth-FA-DHA (PRENATE MINI) 18-0.6-0.4-350 MG CAPS Comments:   Reason for Stopping:             Condition at Discharge:    Stable    No discharge procedures on file. Discharge to: Home  Follow up in 2 weeks at office.

## 2021-10-16 NOTE — PROGRESS NOTES
Progress Note    SUBJECTIVE:   Pt comfortable; +void; +flatus; +ambulation    OBJECTIVE:    VITALS:  /61   Pulse 79   Temp 98 °F (36.7 °C) (Oral)   Resp 16   Ht 5' 9\" (1.753 m)   Wt 189 lb (85.7 kg)   LMP 01/10/2021   SpO2 98%   Breastfeeding Unknown   BMI 27.91 kg/m²   Physical Exam  ABDOMEN:  normal bowel sounds, non-distended, non-tender and incision d/i  Ext nt    DATA:  Hemoglobin/Hematocrit:    Lab Results   Component Value Date    HGB 10.0 10/15/2021    HCT 29.6 10/15/2021       ASSESSMENT AND PLAN:  S/p ltcs  Active Problems:    39 weeks gestation of pregnancy     delivery indicated due to breech presentation  Resolved Problems:    * No resolved hospital problems.  *    POD#2  Plan discharge home today    Electronically signed by Acacia Tamayo DO on 10/16/2021 at 9:06 AM

## 2021-10-16 NOTE — LACTATION NOTE
Pt continues to exclusively pump. Since switching out EBP production collection had increased. It would be unclear if the pump had an influence or more so timing. Pt is collection about 2 ounces total per pumping as of now. Baby taking approx. 1 ounce per feeding. Pt has all needed supplies at bedside.

## 2021-10-28 PROBLEM — O09.899 SHORT INTERVAL BETWEEN PREGNANCIES AFFECTING PREGNANCY, ANTEPARTUM: Status: ACTIVE | Noted: 2021-04-07

## 2021-10-28 PROBLEM — O42.90 PROM (PREMATURE RUPTURE OF MEMBRANES): Status: ACTIVE | Noted: 2020-07-23

## 2021-10-28 PROBLEM — O09.292 HISTORY OF PREMATURE RUPTURE OF MEMBRANES IN PREVIOUS PREGNANCY, CURRENTLY PREGNANT IN SECOND TRIMESTER: Status: ACTIVE | Noted: 2020-07-23

## (undated) DEVICE — STAPLER SKIN SQ 30 ABSRB STPL DISP INSORB

## (undated) DEVICE — SUTURE PLN GUT SZ 3-0 L27IN ABSRB YELLOWISH TAN L36MM CT-1 842H

## (undated) DEVICE — CONTAINER SPEC 64OZ POLYPR PATH SNAP LOK CAP W/ LID

## (undated) DEVICE — SUTURE STRATAFIX SPRL SZ 1 L14IN ABSRB VLT L48CM CTX 1/2 SXPD2B405

## (undated) DEVICE — COVER,LIGHT HANDLE,FLX,2/PK: Brand: MEDLINE INDUSTRIES, INC.

## (undated) DEVICE — CATHETERIZATION KIT FOL16 FR 2000 CC DRAINAGE BG LUBRICATH

## (undated) DEVICE — COUNTER NDL 30 COUNT DBL MAG

## (undated) DEVICE — GOWN,SIRUS,FABRNF,L,20/CS: Brand: MEDLINE

## (undated) DEVICE — CESAREAN BIRTH PACK II: Brand: MEDLINE INDUSTRIES, INC.

## (undated) DEVICE — SHEET,DRAPE,53X77,STERILE: Brand: MEDLINE

## (undated) DEVICE — SUTURE CHROMIC GUT SZ 1 L36IN ABSRB BRN L40MM CT 1/2 CIR 915H

## (undated) DEVICE — SPONGE LAP W18XL18IN WHT COT 4 PLY FLD STRUNG RADPQ DISP ST

## (undated) DEVICE — 3000CC GUARDIAN II: Brand: GUARDIAN

## (undated) DEVICE — APPLICATOR PREP 26ML 0.7% IOD POVACRYLEX 74% ISO ALC ST

## (undated) DEVICE — MEDI-VAC YANKAUER SUCTION HANDLE W/BULBOUS TIP: Brand: CARDINAL HEALTH

## (undated) DEVICE — ELECTRODE PT RET AD L9FT HI MOIST COND ADH HYDRGEL CORDED

## (undated) DEVICE — TUBING, SUCTION, 3/16" X 12', STRAIGHT: Brand: MEDLINE

## (undated) DEVICE — GLOVE,SURG,SENSICARE,ALOE,LF,PF,7: Brand: MEDLINE

## (undated) DEVICE — PEN: MARKING STD 100/CS: Brand: MEDICAL ACTION INDUSTRIES

## (undated) DEVICE — TUBE BLD COLLECT ST 1 SIL COAT 7ML 10ML

## (undated) DEVICE — TOWEL,OR,DSP,ST,BLUE,STD,6/PK,12PK/CS: Brand: MEDLINE

## (undated) DEVICE — HYPODERMIC SAFETY NEEDLE: Brand: MAGELLAN

## (undated) DEVICE — SKIN AFFIX SURG ADHESIVE 72/CS 0.55ML: Brand: MEDLINE

## (undated) DEVICE — GLOVE ORANGE PI 7   MSG9070

## (undated) DEVICE — Device: Brand: PORTEX

## (undated) DEVICE — BLADE SURG NO20 S STL STR DISP GLASSVAN

## (undated) DEVICE — CONTAINER,SPEC,PNEUM TUBE,3OZ,STRL PATH: Brand: MEDLINE

## (undated) DEVICE — GOWN,SIRUS,POLYRNF,BRTHSLV,XLN/XL,20/CS: Brand: MEDLINE

## (undated) DEVICE — PENCIL ES L3M BTTN SWCH HOLSTER W/ BLDE ELECTRD EDGE

## (undated) DEVICE — GLOVE SURG SZ 65 L12IN FNGR THK83MIL CRM POLYISOPRENE